# Patient Record
Sex: MALE | Race: WHITE
[De-identification: names, ages, dates, MRNs, and addresses within clinical notes are randomized per-mention and may not be internally consistent; named-entity substitution may affect disease eponyms.]

---

## 2020-12-07 ENCOUNTER — APPOINTMENT (OUTPATIENT)
Dept: OTOLARYNGOLOGY | Facility: CLINIC | Age: 65
End: 2020-12-07

## 2020-12-07 PROBLEM — Z00.00 ENCOUNTER FOR PREVENTIVE HEALTH EXAMINATION: Status: ACTIVE | Noted: 2020-12-07

## 2021-11-02 ENCOUNTER — NON-APPOINTMENT (OUTPATIENT)
Age: 66
End: 2021-11-02

## 2021-11-03 ENCOUNTER — APPOINTMENT (OUTPATIENT)
Dept: OTOLARYNGOLOGY | Facility: CLINIC | Age: 66
End: 2021-11-03
Payer: MEDICARE

## 2021-11-03 PROCEDURE — 31575 DIAGNOSTIC LARYNGOSCOPY: CPT

## 2021-11-03 PROCEDURE — 99204 OFFICE O/P NEW MOD 45 MIN: CPT | Mod: 25

## 2021-11-03 NOTE — HISTORY OF PRESENT ILLNESS
[de-identified] : GABRIELA MILIAN Was seen on November 3. I had done a thyroplasty 3B for him in 2014.  He comes in having had significant problems in terms of his swallowing and is asking for my opinion.  He drinks in copious records to review and this showed that had achalasia and had any lower esophageal valve poem.  Now he notes that he cannot swallow and that fluid comes up undigested hours later. He has to bring up saliva. He notes his ears itch and he is miserable.\par The patient had no other ear nose or throat complaints at this visit.

## 2021-11-03 NOTE — CONSULT LETTER
[FreeTextEntry2] : SHAMIR DIXON\par  [FreeTextEntry1] : \par \par Dear  Dr. SHAMIR DIXON,\par \par I had the pleasure of seeing your patient today.  \par Please see my note below.\par \par \par Thank you very much for allowing me to participate in the care of your patient.\par \par Sincerely,\par \par \par Bart Webber MD\par [facility]\par [facility]\par  [facility]\par \par

## 2021-11-03 NOTE — REASON FOR VISIT
[Initial Consultation] : an initial consultation for [FreeTextEntry2] : second opinion, throat problem

## 2021-11-03 NOTE — PHYSICAL EXAM
[FreeTextEntry1] : \par The patient was alert and oriented and in no distress.\par Voice was clear.\par \par Face:\par The patient had no facial asymmetry or mass.\par The skin was unremarkable.\par \par Eyes:\par The pupils were equal round and reactive to light and accommodation.\par There was no significant nystagmus or disconjugate gaze noted.\par \par Nose: \par The external nose had no significant deformity.  There was no facial tenderness.  On anterior rhinoscopy, the nasal mucosa was clear.  The anterior septum was midline.  There were no visualized polyps purulence  or masses.\par \par Oral cavity:\par The oral mucosa was normal.\par The oral and base of tongue were clear and without mass.\par The gingival and buccal mucosa were moist and without lesions.\par The palate moved well.\par There was no cleft to the palate.\par There appeared to be good salivary flow.  \par There was no pus, erythema or mass in the oral cavity.\par \par \par Ears:\par The external ears were normal without deformity.\par The ear canals were clear.\par The tympanic membranes were intact and normal.\par \par Neck: \par The neck was symmetrical.\par The parotid and submandibular glands were normal without masses.\par The trachea was midline and there was no unusual crepitus.\par The thyroid was smooth and nontender and no masses were palpated.\par There was no significant cervical adenopathy.\par \par \par Neuro:\par Neurologically, the patient was awake, alert, and oriented to person, place and time. There were no obvious focal neurologic abnormalities.  Cranial nerves II through XII were grossly intact.\par \par \par TMJ:\par The temporomandibular joints were nontender.\par There was no abnormal crepitus and no significant malocclusion\par \par Flexible fiberoptic laryngoscopy: CPT 65706\par Indications: Dysphagia\par Procedure note:\par \par Flexible fiberoptic laryngoscopy was performed because of dysphagia and because of the patient's inability to tolerate adequate mirror examination.\par \par The nasal cavity was anesthetized with Pontocaine and Afrin.\par The flexible endoscope was placed into the patient's nasal cavity.\par The nasopharynx was without masses.\par The oropharynx, vallecula and base of tongue had no masses.\par The epiglottis, aryepiglottic folds and false vocal cords were normal.\par The pyriform sinuses were without mucosal lesions or pooling of secretions.  \par The laryngeal ventricles were without lesions.\par The visualized subglottis was without mass.\par The lateral and posterior pharyngeal walls were clear and symmetrical.\par The vocal folds were clear and mobile; they abducted and adducted normally.\par There was no interarytenoid mass or fullness.\par \par Endoscopy was done with Covid precautions and with video. All risks and benefits were discussed with the patient and consent obtained. He had slight bogginess of the anterior vocal folds bilaterally\par \par \par

## 2021-11-03 NOTE — ASSESSMENT
[FreeTextEntry1] : It was my impression that he is 17 years status post successful thyroplasty IIIB. 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 He is rather miserable.  I reviewed his reports with him and his symptoms and I agreed that he needs to have the upper esophageal valve treated. This apparently is what the plan has been and I recommended that he go ahead with that.\par

## 2021-11-05 RX ORDER — FLUOCINOLONE ACETONIDE 0.11 MG/ML
0.01 OIL AURICULAR (OTIC)
Qty: 1 | Refills: 0 | Status: ACTIVE | COMMUNITY
Start: 2021-11-05 | End: 1900-01-01

## 2023-02-15 ENCOUNTER — APPOINTMENT (OUTPATIENT)
Dept: PLASTIC SURGERY | Facility: CLINIC | Age: 68
End: 2023-02-15

## 2024-06-12 ENCOUNTER — APPOINTMENT (OUTPATIENT)
Dept: OTOLARYNGOLOGY | Facility: CLINIC | Age: 69
End: 2024-06-12
Payer: MEDICARE

## 2024-06-12 VITALS — WEIGHT: 195 LBS | HEIGHT: 69 IN | BODY MASS INDEX: 28.88 KG/M2

## 2024-06-12 DIAGNOSIS — B20 HUMAN IMMUNODEFICIENCY VIRUS [HIV] DISEASE: ICD-10-CM

## 2024-06-12 DIAGNOSIS — J31.0 CHRONIC RHINITIS: ICD-10-CM

## 2024-06-12 DIAGNOSIS — R13.14 DYSPHAGIA, PHARYNGOESOPHAGEAL PHASE: ICD-10-CM

## 2024-06-12 DIAGNOSIS — R04.0 EPISTAXIS: ICD-10-CM

## 2024-06-12 DIAGNOSIS — H60.8X3 OTHER OTITIS EXTERNA, BILATERAL: ICD-10-CM

## 2024-06-12 DIAGNOSIS — R13.19 OTHER DYSPHAGIA: ICD-10-CM

## 2024-06-12 DIAGNOSIS — Z87.891 PERSONAL HISTORY OF NICOTINE DEPENDENCE: ICD-10-CM

## 2024-06-12 PROCEDURE — 99214 OFFICE O/P EST MOD 30 MIN: CPT | Mod: 25

## 2024-06-12 PROCEDURE — 31231 NASAL ENDOSCOPY DX: CPT

## 2024-06-12 RX ORDER — PANTOPRAZOLE 40 MG/1
TABLET, DELAYED RELEASE ORAL
Refills: 0 | Status: ACTIVE | COMMUNITY

## 2024-06-12 RX ORDER — DEXTROAMPHETAMINE SACCHARATE, AMPHETAMINE ASPARTATE, DEXTROAMPHETAMINE SULFATE, AND AMPHETAMINE SULFATE 3.75; 3.75; 3.75; 3.75 MG/1; MG/1; MG/1; MG/1
TABLET ORAL
Refills: 0 | Status: ACTIVE | COMMUNITY

## 2024-06-12 RX ORDER — DOLUTEGRAVIR SODIUM 50 MG/1
TABLET, FILM COATED ORAL
Refills: 0 | Status: ACTIVE | COMMUNITY

## 2024-06-12 RX ORDER — VALACYCLOVIR 500 MG/1
TABLET, FILM COATED ORAL
Refills: 0 | Status: ACTIVE | COMMUNITY

## 2024-06-12 RX ORDER — FLUOCINOLONE ACETONIDE 0.11 MG/ML
OIL AURICULAR (OTIC)
Refills: 0 | Status: ACTIVE | COMMUNITY

## 2024-06-12 RX ORDER — FAMOTIDINE 10 MG/1
TABLET, FILM COATED ORAL
Refills: 0 | Status: ACTIVE | COMMUNITY

## 2024-06-12 RX ORDER — EMTRICITABINE AND TENOFOVIR ALAFENAMIDE 120; 15 MG/1; MG/1
TABLET ORAL
Refills: 0 | Status: ACTIVE | COMMUNITY

## 2024-06-12 NOTE — REVIEW OF SYSTEMS
[Negative] : Heme/Lymph [Ear Itch] : ear itch [Nasal Congestion] : nasal congestion [Nose Bleeds] : nose bleeds [de-identified] : dysphagia

## 2024-06-12 NOTE — HISTORY OF PRESENT ILLNESS
[de-identified] : GABRIELA MILIAN is a 68 year old male who comes in complaining of recurrent left-sided epistaxis.  He has had about 5 episodes in the last 2 weeks and the last was about a week ago.  He is being evaluated for possible lymphoma.  He also is complaining that his ears itch but he has now been using triamcinolone ointment on fingertip and that has helped.  I had done thyroplasty 3B on him in 2014 and his voice has remained good.  I had seen him 3 years ago with achalasia.  The patient had no other ear nose or throat complaints at this visit.

## 2024-06-12 NOTE — ASSESSMENT
[FreeTextEntry1] : It was my impression that he has an eczematoid otitis externa that is improving with triamcinolone ointment and I recommended continuing to use this digitally  He has reflux and is causing some of his postnasal drip as well.  I suggested switching to alkaline water.  He is taking omeprazole in the morning and famotidine at night already.he has achalasia  It was my impression that the patient had had an episode of epistaxis.  There was no active bleeding and no obvious points to cauterize at this time. There is a septal deflection and the bleeding was likely  from the deflected point.  In any case I reviewed the pathogenesis.  I suggested 3 days of Afrin and topical moisturizing for the heating season.  I explained that it will probably take about 2 weeks to heal completely and would like to see the patient should  the bleeding recur.

## 2024-06-12 NOTE — CONSULT LETTER
[FreeTextEntry2] : SHAMIR DIXON [FreeTextEntry1] :   Dear  Dr. SHAMIR DIXON,  I had the pleasure of seeing your patient today.   Please see my note below.   Thank you very much for allowing me to participate in the care of your patient.  Sincerely,  Bart Webber MD [facility]  [facility]

## 2024-06-12 NOTE — PHYSICAL EXAM
[FreeTextEntry1] : General: The patient was alert and oriented and in no distress. Voice was clear.  Face: The patient had no facial asymmetry or mass. The skin was unremarkable.  Nose:  The external nose had no significant deformity.  There was no facial tenderness.  On anterior rhinoscopy, the nasal mucosa was clear.  The anterior septum was midline.  There were no visualized polyps purulence  or masses.  Oral cavity: The oral mucosa was normal. The oral and base of tongue were clear and without mass. The gingival and buccal mucosa were moist and without lesions. The palate moved well. There was no cleft to the palate. There appeared to be good salivary flow.   There was no pus, erythema or mass in the oral cavity.  Ears: The external ears were normal without deformity. The ear canals were clear. The tympanic membranes were intact and normal. Both ear canals were dry and devoid of cerumen   [de-identified] : Nasal endoscopy:  CPT 16509 Procedure Note:  Endoscopy was done with Covid precautions and with video. All risks and benefits were discussed with the patient and consent obtained.  Nasal endoscopy was done with topical anesthesia of Pontocaine and Afrin and a      nasal endoscope. Indication: Epistaxis Procedure: The nasal cavity was anesthetized with topical Afrin and Pontocaine. An  endoscope was used and inserted into the nasal cavity. Attention was first paid to the anterior nasal cavity. Endocoscopy was performed to inspect the interior of the nasal cavity, the nasal septum,  the middle and superior meati, the inferior, middle and superior turbinates, and the spheno-ethmoidal  recesses, the nasopharynx and eustachian tube orifices bilaterally. including the nasal mocosa, the possibility of polyps and the consistency of the nasal mucous. All findings were normal except:  He had a significant S-shaped septal deflection and there was a prominent blood vessel on the left septal concavity but no evidence of recent bleeding   Flexible fiberoptic laryngoscopy: CPT 28272 Indications: Dysphagia Procedure note:  Flexible fiberoptic laryngoscopy was performed because of dysphagia and because of the patient's inability to tolerate adequate mirror examination.  The nasal cavity was anesthetized with Pontocaine and Afrin. The flexible endoscope was placed into the patient's nasal cavity. The nasopharynx was without masses. The oropharynx, vallecula and base of tongue had no masses. The epiglottis, aryepiglottic folds and false vocal cords were normal. The pyriform sinuses were without mucosal lesions or pooling of secretions.   The laryngeal ventricles were without lesions. The visualized subglottis was without mass. The lateral and posterior pharyngeal walls were clear and symmetrical. The vocal folds were clear and mobile; they abducted and adducted normally. There was no interarytenoid mass or fullness.  Endoscopy was done with Covid precautions and with video. All risks and benefits were discussed with the patient and consent obtained.  There is posterior laryngeal inflammation